# Patient Record
Sex: FEMALE | Race: OTHER | Employment: FULL TIME | URBAN - METROPOLITAN AREA
[De-identification: names, ages, dates, MRNs, and addresses within clinical notes are randomized per-mention and may not be internally consistent; named-entity substitution may affect disease eponyms.]

---

## 2023-05-30 ENCOUNTER — APPOINTMENT (OUTPATIENT)
Dept: GENERAL RADIOLOGY | Age: 38
End: 2023-05-30
Attending: PHYSICIAN ASSISTANT
Payer: COMMERCIAL

## 2023-05-30 ENCOUNTER — TELEPHONE (OUTPATIENT)
Dept: ORTHOPEDICS CLINIC | Facility: CLINIC | Age: 38
End: 2023-05-30

## 2023-05-30 ENCOUNTER — HOSPITAL ENCOUNTER (OUTPATIENT)
Age: 38
Discharge: HOME | End: 2023-05-30
Payer: COMMERCIAL

## 2023-05-30 VITALS
RESPIRATION RATE: 16 BRPM | OXYGEN SATURATION: 100 % | HEART RATE: 67 BPM | WEIGHT: 153 LBS | HEIGHT: 68 IN | DIASTOLIC BLOOD PRESSURE: 85 MMHG | TEMPERATURE: 97 F | SYSTOLIC BLOOD PRESSURE: 147 MMHG | BODY MASS INDEX: 23.19 KG/M2

## 2023-05-30 DIAGNOSIS — S86.011A STRAIN OF RIGHT ACHILLES TENDON, INITIAL ENCOUNTER: Primary | ICD-10-CM

## 2023-05-30 PROCEDURE — 73610 X-RAY EXAM OF ANKLE: CPT | Performed by: PHYSICIAN ASSISTANT

## 2023-05-30 PROCEDURE — 99204 OFFICE O/P NEW MOD 45 MIN: CPT

## 2023-05-30 PROCEDURE — 99203 OFFICE O/P NEW LOW 30 MIN: CPT

## 2023-05-30 RX ORDER — ETONOGESTREL 68 MG/1
IMPLANT SUBCUTANEOUS AS DIRECTED
COMMUNITY

## 2023-05-30 NOTE — ED INITIAL ASSESSMENT (HPI)
Patient states she has been running for months but 2 weeks ago started with pain and swelling to the right achilles area. States she took a week off and pain and swelling got better but have returned when she started running again and has a lump to the area. Patient states she has been taking ibuprofen PRN, last dose was around 8am today.  Denies any numbness/tingling to the foot and denies any injury in the past.

## 2023-06-15 ENCOUNTER — OFFICE VISIT (OUTPATIENT)
Dept: ORTHOPEDICS CLINIC | Facility: CLINIC | Age: 38
End: 2023-06-15
Payer: COMMERCIAL

## 2023-06-15 DIAGNOSIS — M76.821 POSTERIOR TIBIAL TENDONITIS OF RIGHT LEG: Primary | ICD-10-CM

## 2023-06-15 PROCEDURE — 99204 OFFICE O/P NEW MOD 45 MIN: CPT | Performed by: ORTHOPAEDIC SURGERY

## 2023-06-15 RX ORDER — CETIRIZINE HYDROCHLORIDE 10 MG/1
10 TABLET ORAL DAILY
COMMUNITY

## 2023-06-15 RX ORDER — MECOBALAMIN 5000 MCG
30 TABLET,DISINTEGRATING ORAL
COMMUNITY

## 2023-10-09 ENCOUNTER — HOSPITAL ENCOUNTER (OUTPATIENT)
Age: 38
Discharge: HOME | End: 2023-10-09
Payer: COMMERCIAL

## 2023-10-09 VITALS
TEMPERATURE: 98 F | RESPIRATION RATE: 18 BRPM | HEART RATE: 83 BPM | SYSTOLIC BLOOD PRESSURE: 134 MMHG | WEIGHT: 160 LBS | DIASTOLIC BLOOD PRESSURE: 77 MMHG | BODY MASS INDEX: 24.25 KG/M2 | OXYGEN SATURATION: 100 % | HEIGHT: 68 IN

## 2023-10-09 DIAGNOSIS — J06.9 UPPER RESPIRATORY TRACT INFECTION, UNSPECIFIED TYPE: Primary | ICD-10-CM

## 2023-10-09 LAB
POCT INFLUENZA A: NEGATIVE
POCT INFLUENZA B: NEGATIVE
S PYO AG THROAT QL IA.RAPID: NEGATIVE
SARS-COV-2 RNA RESP QL NAA+PROBE: NOT DETECTED

## 2023-10-09 PROCEDURE — 99212 OFFICE O/P EST SF 10 MIN: CPT

## 2023-10-09 PROCEDURE — 87502 INFLUENZA DNA AMP PROBE: CPT | Performed by: NURSE PRACTITIONER

## 2023-10-09 PROCEDURE — 87651 STREP A DNA AMP PROBE: CPT | Performed by: NURSE PRACTITIONER

## 2023-10-09 PROCEDURE — 99213 OFFICE O/P EST LOW 20 MIN: CPT

## 2023-12-27 ENCOUNTER — HOSPITAL ENCOUNTER (OUTPATIENT)
Age: 38
Discharge: HOME | End: 2023-12-27
Payer: COMMERCIAL

## 2023-12-27 VITALS
OXYGEN SATURATION: 98 % | HEIGHT: 67.5 IN | RESPIRATION RATE: 18 BRPM | WEIGHT: 160 LBS | BODY MASS INDEX: 24.82 KG/M2 | DIASTOLIC BLOOD PRESSURE: 97 MMHG | TEMPERATURE: 99 F | HEART RATE: 89 BPM | SYSTOLIC BLOOD PRESSURE: 136 MMHG

## 2023-12-27 DIAGNOSIS — J02.0 STREPTOCOCCAL SORE THROAT: Primary | ICD-10-CM

## 2023-12-27 LAB
POCT INFLUENZA A: NEGATIVE
POCT INFLUENZA B: NEGATIVE
S PYO AG THROAT QL IA.RAPID: POSITIVE
SARS-COV-2 RNA RESP QL NAA+PROBE: NOT DETECTED

## 2023-12-27 PROCEDURE — 87651 STREP A DNA AMP PROBE: CPT | Performed by: NURSE PRACTITIONER

## 2023-12-27 PROCEDURE — 99214 OFFICE O/P EST MOD 30 MIN: CPT

## 2023-12-27 PROCEDURE — 99213 OFFICE O/P EST LOW 20 MIN: CPT

## 2023-12-27 PROCEDURE — 87502 INFLUENZA DNA AMP PROBE: CPT | Performed by: NURSE PRACTITIONER

## 2023-12-27 RX ORDER — LIDOCAINE HYDROCHLORIDE 20 MG/ML
10 SOLUTION OROPHARYNGEAL ONCE
Status: COMPLETED | OUTPATIENT
Start: 2023-12-27 | End: 2023-12-27

## 2023-12-27 RX ORDER — PENICILLIN V POTASSIUM 500 MG/1
500 TABLET ORAL 2 TIMES DAILY
Qty: 20 TABLET | Refills: 0 | Status: SHIPPED | OUTPATIENT
Start: 2023-12-27 | End: 2024-01-06

## 2023-12-27 NOTE — DISCHARGE INSTRUCTIONS
Strep Throat / Tonsillitis care measures   - Take antibiotics as directed and to completion   - You are considered contagious for 24-hours from starting antibiotics   - Wash hands often   - Disinfect your environment, especially high-touch items   - Drink plenty of fluids   - Get plenty of rest   - Do not share utensils or drinks   - Change toothbrush after 24-hours on antibiotics   - You may benefit from salt water gargles throughout the day   - Alternate Ibuprofen and Tylenol as needed for pain / fever   - You may benefit from throat lozenges for throat pain (e.g. Cepacol, Ricola, etc.)   - Follow up with your primary care provider as needed

## 2024-04-15 NOTE — LETTER
Date: 6/15/2023    Patient Name: Fabien Damon          To Whom it may concern: This letter has been written at the patient's request. The above patient was seen at one of the [facility] locations for treatment of a medical condition. The patient is excused from work and is advised to not stand, walk, or drive for two weeks until 06/30/23. Please feel free to contact us at [phone].         Sincerely,    Heike Greene MD
gait, locomotion, and balance/gross motor/muscle strength/posture

## 2024-12-27 ENCOUNTER — TELEPHONE (OUTPATIENT)
Dept: ORTHOPEDICS CLINIC | Facility: CLINIC | Age: 39
End: 2024-12-27

## 2024-12-27 NOTE — TELEPHONE ENCOUNTER
Received disability forms via email from outside office of Dr. Mcgraw also addressed to an Oncologist, patient only seen Lucien baeza/ Licha. No authorization attached, sending "[employer]hart to obtain Release of Information completion, Logged for processing.

## 2025-05-25 ENCOUNTER — HOSPITAL ENCOUNTER (OUTPATIENT)
Age: 40
Discharge: HOME | End: 2025-05-25
Payer: COMMERCIAL

## 2025-05-25 ENCOUNTER — APPOINTMENT (OUTPATIENT)
Dept: GENERAL RADIOLOGY | Age: 40
End: 2025-05-25
Attending: NURSE PRACTITIONER
Payer: COMMERCIAL

## 2025-05-25 VITALS
DIASTOLIC BLOOD PRESSURE: 96 MMHG | HEIGHT: 68 IN | OXYGEN SATURATION: 100 % | WEIGHT: 150 LBS | TEMPERATURE: 99 F | SYSTOLIC BLOOD PRESSURE: 151 MMHG | RESPIRATION RATE: 18 BRPM | HEART RATE: 86 BPM | BODY MASS INDEX: 22.73 KG/M2

## 2025-05-25 DIAGNOSIS — S90.32XA CONTUSION OF FOOT OR HEEL, LEFT, INITIAL ENCOUNTER: Primary | ICD-10-CM

## 2025-05-25 PROCEDURE — 99214 OFFICE O/P EST MOD 30 MIN: CPT

## 2025-05-25 PROCEDURE — 73650 X-RAY EXAM OF HEEL: CPT | Performed by: NURSE PRACTITIONER

## 2025-05-25 RX ORDER — LOSARTAN POTASSIUM 50 MG/1
TABLET ORAL DAILY
COMMUNITY

## 2025-05-25 NOTE — ED PROVIDER NOTES
Patient Seen in: [facility]        History  Chief Complaint   Patient presents with    Leg or Foot Injury     Entered by patient     Stated Complaint: Leg or Foot Injury    Subjective:   HPI            Patient is a pleasant 39-year-old female here for evaluation of left plantar heel pain.  Injury was sustained yesterday as she states she was jumping out of the hot tub and landed directly onto the heel.  Since this injury, she has been unable to bear weight.      Objective:     Past Medical History:    Esophageal reflux    Essential hypertension              Past Surgical History:   Procedure Laterality Date    Bunionectomy Right                 Social History     Socioeconomic History    Marital status:    Tobacco Use    Smoking status: Never    Smokeless tobacco: Never   Vaping Use    Vaping status: Never Used   Substance and Sexual Activity    Alcohol use: Yes     Comment: social    Drug use: Never     Social Drivers of Health     Food Insecurity: No Food Insecurity (11/12/2024)    Received from [facility]    Hunger Vital Sign     Worried About Running Out of Food in the Last Year: Never true     Ran Out of Food in the Last Year: Never true   Transportation Needs: No Transportation Needs (11/12/2024)    Received from [facility]    PRAPARE - Transportation     Lack of Transportation (Medical): No     Lack of Transportation (Non-Medical): No   Housing Stability: Low Risk  (11/12/2024)    Received from [facility]    Housing Stability Vital Sign     Unable to Pay for Housing in the Last Year: No     Number of Times Moved in the Last Year: 0     Homeless in the Last Year: No              Review of Systems    Positive for stated complaint: Leg or Foot Injury  Other systems are as noted in HPI.  Constitutional and vital signs reviewed.      All other systems reviewed and negative except as noted  above.                  Physical Exam    ED Triage Vitals [05/25/25 1403]   BP (!) 151/96   Pulse 86   Resp 18   Temp 99.2 °F (37.3 °C)   Temp src Oral   SpO2 100 %   O2 Device None (Room air)       Current Vitals:   Vital Signs  BP: (!) 151/96  Pulse: 86  Resp: 18  Temp: 99.2 °F (37.3 °C)  Temp src: Oral    Oxygen Therapy  SpO2: 100 %  O2 Device: None (Room air)            Physical Exam  Vitals and nursing note reviewed.   Constitutional:       General: She is not in acute distress.     Appearance: Normal appearance. She is not ill-appearing, toxic-appearing or diaphoretic.   HENT:      Mouth/Throat:      Mouth: Mucous membranes are moist.   Eyes:      Conjunctiva/sclera: Conjunctivae normal.      Pupils: Pupils are equal, round, and reactive to light.   Cardiovascular:      Rate and Rhythm: Normal rate.   Pulmonary:      Effort: Pulmonary effort is normal.   Musculoskeletal:      Left foot: Normal range of motion and normal capillary refill. Swelling and tenderness present. Normal pulse.      Comments: Left plantar calcaneous tenderness with palpation   Neurological:      Mental Status: She is alert.             ED Course  Labs Reviewed - No data to display    XR HEEL (CALCANEUS) (MIN 2 VIEWS), LEFT (CPT=73650)  Result Date: 5/25/2025  CONCLUSION:  Calcaneal plantar spurring.  No acute fracture or dislocation.   LOCATION:  [id number]   Dictated by (CST): Shandra Griggs MD on 5/25/2025 at 3:01 PM     Finalized by (CST): Shandra Griggs MD on 5/25/2025 at 3:01 PM                       [facility]           Medical Decision Making  Differentials include but are not limited to you here Rheault contusion versus fracture.  X-rays unremarkable which I personally reviewed, I do not observe obvious fracture.  Plan for supportive treatment including rest, ice, NSAID and utilize crutches for mobility assistance.  Close outpatient follow-up with podiatry as needed.  Patient agrees with plan of care.  All questions answered to patient's  satisfaction    Amount and/or Complexity of Data Reviewed  Radiology: ordered and independent interpretation performed. Decision-making details documented in ED Course.        Disposition and Plan     Clinical Impression:  1. Contusion of foot or heel, left, initial encounter         Disposition:  Discharge  5/25/2025  3:11 pm    Follow-up:  Zulay King, DPMAURICE  [address] [place] [id number]  [phone]      As needed          Medications Prescribed:  Discharge Medication List as of 5/25/2025  3:16 PM                Supplementary Documentation:

## 2025-05-25 NOTE — ED INITIAL ASSESSMENT (HPI)
Patient reports jumping from hot tub to hard ground last night impact to heel and has pain since unable to bear weight on the heel